# Patient Record
Sex: MALE | Race: WHITE | URBAN - METROPOLITAN AREA
[De-identification: names, ages, dates, MRNs, and addresses within clinical notes are randomized per-mention and may not be internally consistent; named-entity substitution may affect disease eponyms.]

---

## 2023-09-06 ENCOUNTER — OFFICE (OUTPATIENT)
Dept: URBAN - METROPOLITAN AREA CLINIC 105 | Facility: CLINIC | Age: 78
End: 2023-09-06
Payer: COMMERCIAL

## 2023-09-06 VITALS
HEIGHT: 75 IN | WEIGHT: 247 LBS | SYSTOLIC BLOOD PRESSURE: 120 MMHG | HEART RATE: 72 BPM | DIASTOLIC BLOOD PRESSURE: 70 MMHG | OXYGEN SATURATION: 96 %

## 2023-09-06 DIAGNOSIS — K21.9 GASTRO-ESOPHAGEAL REFLUX DISEASE WITHOUT ESOPHAGITIS: ICD-10-CM

## 2023-09-06 DIAGNOSIS — R13.10 DYSPHAGIA, UNSPECIFIED: ICD-10-CM

## 2023-09-06 PROCEDURE — 99204 OFFICE O/P NEW MOD 45 MIN: CPT

## 2023-09-06 RX ORDER — ESOMEPRAZOLE MAGNESIUM 40 MG/1
CAPSULE, DELAYED RELEASE ORAL
Qty: 90 | Refills: 0 | Status: ACTIVE
Start: 2023-09-06

## 2023-09-06 NOTE — SERVICEHPINOTES
Bryan De Los Santos   is seen for an initial visit today.     Hx of GERD. Worsening sx, takes GAviscon first thing in AM to rid of discomfort. Has rx for Pantoprazole and famotidine. Goes to [facility]. Just started famotidine. Only takes pantoprazole prn-not sure if it helps if he takes daily.   br Symptoms: nausea, heartburn,  occasional dysphagia to dry foods-stuck in low neck/chest. no cough. +.throat clearing, no hoarseness. No acid regurg.  No abdominal pain at this time. no melena. 
br   Last EGD was approx 2018-reports no significant findings.
brLast colonoscopy was 2017-may have had polyps, repeat 5 yrs. as of now, he does not want any further colonoscopies.